# Patient Record
Sex: FEMALE | Race: BLACK OR AFRICAN AMERICAN | Employment: UNEMPLOYED | URBAN - METROPOLITAN AREA
[De-identification: names, ages, dates, MRNs, and addresses within clinical notes are randomized per-mention and may not be internally consistent; named-entity substitution may affect disease eponyms.]

---
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
## 2019-03-17 ENCOUNTER — APPOINTMENT (OUTPATIENT)
Dept: ULTRASOUND IMAGING | Facility: CLINIC | Age: 19
End: 2019-03-17
Attending: EMERGENCY MEDICINE

## 2019-03-17 ENCOUNTER — HOSPITAL ENCOUNTER (EMERGENCY)
Facility: CLINIC | Age: 19
Discharge: HOME | End: 2019-03-17
Attending: EMERGENCY MEDICINE | Admitting: EMERGENCY MEDICINE

## 2019-03-17 VITALS
OXYGEN SATURATION: 98 % | TEMPERATURE: 97.5 F | HEIGHT: 64 IN | HEART RATE: 90 BPM | RESPIRATION RATE: 18 BRPM | SYSTOLIC BLOOD PRESSURE: 118 MMHG | DIASTOLIC BLOOD PRESSURE: 75 MMHG | BODY MASS INDEX: 24.92 KG/M2 | WEIGHT: 146 LBS

## 2019-03-17 DIAGNOSIS — R35.0 URINARY FREQUENCY: ICD-10-CM

## 2019-03-17 DIAGNOSIS — R11.2 NON-INTRACTABLE VOMITING WITH NAUSEA, UNSPECIFIED VOMITING TYPE: ICD-10-CM

## 2019-03-17 DIAGNOSIS — R10.84 ABDOMINAL PAIN, GENERALIZED: ICD-10-CM

## 2019-03-17 LAB
ALBUMIN SERPL-MCNC: 3.9 G/DL (ref 3.4–5)
ALBUMIN UR-MCNC: 30 MG/DL
ALP SERPL-CCNC: 104 U/L (ref 40–150)
ALT SERPL W P-5'-P-CCNC: 13 U/L (ref 0–50)
ANION GAP SERPL CALCULATED.3IONS-SCNC: 8 MMOL/L (ref 3–14)
APPEARANCE UR: CLEAR
AST SERPL W P-5'-P-CCNC: 11 U/L (ref 0–35)
BASOPHILS # BLD AUTO: 0 10E9/L (ref 0–0.2)
BASOPHILS NFR BLD AUTO: 0 %
BILIRUB SERPL-MCNC: 0.3 MG/DL (ref 0.2–1.3)
BILIRUB UR QL STRIP: ABNORMAL
BUN SERPL-MCNC: 7 MG/DL (ref 7–30)
CALCIUM SERPL-MCNC: 8.6 MG/DL (ref 8.5–10.1)
CHLORIDE SERPL-SCNC: 110 MMOL/L (ref 96–110)
CO2 SERPL-SCNC: 22 MMOL/L (ref 20–32)
COLOR UR AUTO: YELLOW
CREAT SERPL-MCNC: 0.5 MG/DL (ref 0.5–1)
DIFFERENTIAL METHOD BLD: NORMAL
EOSINOPHIL # BLD AUTO: 0 10E9/L (ref 0–0.7)
EOSINOPHIL NFR BLD AUTO: 0.5 %
ERYTHROCYTE [DISTWIDTH] IN BLOOD BY AUTOMATED COUNT: 12.2 % (ref 10–15)
GFR SERPL CREATININE-BSD FRML MDRD: >90 ML/MIN/{1.73_M2}
GLUCOSE SERPL-MCNC: 92 MG/DL (ref 70–99)
GLUCOSE UR STRIP-MCNC: NEGATIVE MG/DL
HCG SERPL QL: NEGATIVE
HCT VFR BLD AUTO: 38.8 % (ref 35–47)
HGB BLD-MCNC: 12.7 G/DL (ref 11.7–15.7)
HGB UR QL STRIP: ABNORMAL
IMM GRANULOCYTES # BLD: 0 10E9/L (ref 0–0.4)
IMM GRANULOCYTES NFR BLD: 0.2 %
KETONES UR STRIP-MCNC: 5 MG/DL
LEUKOCYTE ESTERASE UR QL STRIP: ABNORMAL
LIPASE SERPL-CCNC: 75 U/L (ref 73–393)
LYMPHOCYTES # BLD AUTO: 1.2 10E9/L (ref 0.8–5.3)
LYMPHOCYTES NFR BLD AUTO: 20.3 %
MCH RBC QN AUTO: 28.3 PG (ref 26.5–33)
MCHC RBC AUTO-ENTMCNC: 32.7 G/DL (ref 31.5–36.5)
MCV RBC AUTO: 87 FL (ref 78–100)
MONOCYTES # BLD AUTO: 0.5 10E9/L (ref 0–1.3)
MONOCYTES NFR BLD AUTO: 8.7 %
MUCOUS THREADS #/AREA URNS LPF: PRESENT /LPF
NEUTROPHILS # BLD AUTO: 4.2 10E9/L (ref 1.6–8.3)
NEUTROPHILS NFR BLD AUTO: 70.3 %
NITRATE UR QL: NEGATIVE
PH UR STRIP: 5.5 PH (ref 5–7)
PLATELET # BLD AUTO: 296 10E9/L (ref 150–450)
POTASSIUM SERPL-SCNC: 3.6 MMOL/L (ref 3.4–5.3)
PROT SERPL-MCNC: 8.1 G/DL (ref 6.8–8.8)
RBC # BLD AUTO: 4.48 10E12/L (ref 3.8–5.2)
RBC #/AREA URNS AUTO: >182 /HPF (ref 0–2)
SODIUM SERPL-SCNC: 140 MMOL/L (ref 133–144)
SOURCE: ABNORMAL
SP GR UR STRIP: 1.03 (ref 1–1.03)
UROBILINOGEN UR STRIP-MCNC: NORMAL MG/DL (ref 0–2)
WBC # BLD AUTO: 6 10E9/L (ref 4–11)
WBC #/AREA URNS AUTO: 4 /HPF (ref 0–5)

## 2019-03-17 PROCEDURE — 25000125 ZZHC RX 250: Performed by: EMERGENCY MEDICINE

## 2019-03-17 PROCEDURE — 96375 TX/PRO/DX INJ NEW DRUG ADDON: CPT

## 2019-03-17 PROCEDURE — 81001 URINALYSIS AUTO W/SCOPE: CPT | Performed by: EMERGENCY MEDICINE

## 2019-03-17 PROCEDURE — 96361 HYDRATE IV INFUSION ADD-ON: CPT

## 2019-03-17 PROCEDURE — 99285 EMERGENCY DEPT VISIT HI MDM: CPT | Mod: 25

## 2019-03-17 PROCEDURE — 25000128 H RX IP 250 OP 636: Performed by: EMERGENCY MEDICINE

## 2019-03-17 PROCEDURE — 96374 THER/PROPH/DIAG INJ IV PUSH: CPT

## 2019-03-17 PROCEDURE — 80053 COMPREHEN METABOLIC PANEL: CPT | Performed by: EMERGENCY MEDICINE

## 2019-03-17 PROCEDURE — 25000132 ZZH RX MED GY IP 250 OP 250 PS 637: Performed by: EMERGENCY MEDICINE

## 2019-03-17 PROCEDURE — 83690 ASSAY OF LIPASE: CPT | Performed by: EMERGENCY MEDICINE

## 2019-03-17 PROCEDURE — 76705 ECHO EXAM OF ABDOMEN: CPT

## 2019-03-17 PROCEDURE — 85025 COMPLETE CBC W/AUTO DIFF WBC: CPT | Performed by: EMERGENCY MEDICINE

## 2019-03-17 PROCEDURE — 84703 CHORIONIC GONADOTROPIN ASSAY: CPT | Performed by: EMERGENCY MEDICINE

## 2019-03-17 RX ORDER — OXCARBAZEPINE 300 MG/1
600 TABLET, FILM COATED ORAL ONCE
Status: COMPLETED | OUTPATIENT
Start: 2019-03-17 | End: 2019-03-17

## 2019-03-17 RX ORDER — ACETAMINOPHEN 500 MG
1000 TABLET ORAL ONCE
Status: COMPLETED | OUTPATIENT
Start: 2019-03-17 | End: 2019-03-17

## 2019-03-17 RX ORDER — ONDANSETRON 2 MG/ML
4 INJECTION INTRAMUSCULAR; INTRAVENOUS ONCE
Status: COMPLETED | OUTPATIENT
Start: 2019-03-17 | End: 2019-03-17

## 2019-03-17 RX ORDER — ONDANSETRON 4 MG/1
4-8 TABLET, ORALLY DISINTEGRATING ORAL EVERY 8 HOURS PRN
Qty: 12 TABLET | Refills: 0 | Status: SHIPPED | OUTPATIENT
Start: 2019-03-17 | End: 2019-03-20

## 2019-03-17 RX ORDER — SUCRALFATE 1 G/1
1 TABLET ORAL ONCE
Status: COMPLETED | OUTPATIENT
Start: 2019-03-17 | End: 2019-03-17

## 2019-03-17 RX ORDER — OXCARBAZEPINE 600 MG/1
600 TABLET, FILM COATED ORAL 2 TIMES DAILY
Qty: 2 TABLET | Refills: 0 | Status: SHIPPED | OUTPATIENT
Start: 2019-03-17 | End: 2020-06-19

## 2019-03-17 RX ORDER — KETOROLAC TROMETHAMINE 15 MG/ML
15 INJECTION, SOLUTION INTRAMUSCULAR; INTRAVENOUS ONCE
Status: COMPLETED | OUTPATIENT
Start: 2019-03-17 | End: 2019-03-17

## 2019-03-17 RX ORDER — OXCARBAZEPINE 300 MG/1
600 TABLET, FILM COATED ORAL 2 TIMES DAILY
Qty: 4 TABLET | Refills: 0 | Status: SHIPPED | OUTPATIENT
Start: 2019-03-17 | End: 2020-06-19

## 2019-03-17 RX ADMIN — SODIUM CHLORIDE 1000 ML: 9 INJECTION, SOLUTION INTRAVENOUS at 11:29

## 2019-03-17 RX ADMIN — ACETAMINOPHEN 1000 MG: 500 TABLET, FILM COATED ORAL at 11:29

## 2019-03-17 RX ADMIN — OXCARBAZEPINE 600 MG: 300 TABLET, FILM COATED ORAL at 12:36

## 2019-03-17 RX ADMIN — LIDOCAINE HYDROCHLORIDE 30 ML: 20 SOLUTION ORAL; TOPICAL at 09:53

## 2019-03-17 RX ADMIN — ONDANSETRON 4 MG: 2 INJECTION INTRAMUSCULAR; INTRAVENOUS at 09:45

## 2019-03-17 RX ADMIN — SUCRALFATE 1 G: 1 TABLET ORAL at 11:49

## 2019-03-17 RX ADMIN — KETOROLAC TROMETHAMINE 15 MG: 15 INJECTION, SOLUTION INTRAMUSCULAR; INTRAVENOUS at 09:52

## 2019-03-17 RX ADMIN — RANITIDINE 150 MG: 150 TABLET ORAL at 11:29

## 2019-03-17 ASSESSMENT — MIFFLIN-ST. JEOR: SCORE: 1422.25

## 2019-03-17 ASSESSMENT — ENCOUNTER SYMPTOMS
COUGH: 0
ABDOMINAL PAIN: 1
SORE THROAT: 0
FEVER: 0
BLOOD IN STOOL: 0
NAUSEA: 1
FREQUENCY: 1
DIARRHEA: 0
VOMITING: 1

## 2019-03-17 NOTE — ED PROVIDER NOTES
"  History     Chief Complaint:  Abdominal Pain    The history is provided by the patient.      Rach Bello is a 18 year old female who presents with abdominal pain. The patient reports intermittent abdominal pain for the last couple of months. Today the pain became more severe and she had associated nausea and vomiting leading to her visit today. She denies vomiting up any blood. She states that she was not able to sleep last night because of the pain. She reports some increased urinary frequency and that she feels like she is hyperventilating. Rach denies any fever, cough, sore throat, diarrhea, and change in color of her stool. She is currently on her menstrual period which started yesterday.     Allergies:  No known drug allergies.     Medications:    Docusate sodium (colace) 100 mg tablet  Ferrous gluconate (fergon) 324 (38 fe) mg tablet  Oxcarbazepine (trileptal) 300 mg tablet     Past Medical History:    Closed fracture of unspecified bone   Migraine with aura and without status migrainosus, not intractable   Seizures  Anemia  Gastritis  Esophageal reflux    Past Surgical History:    Left supracondylar fracture repair    Family History:    History reviewed. No pertinent family history.    Social History:  Patient is single  Tobacco Use: Current smoker  Alcohol Use: No  PCP: Hieu Man     Review of Systems   Constitutional: Negative for fever.   HENT: Negative for sore throat.    Respiratory: Negative for cough.         Feels like hyperventilating   Gastrointestinal: Positive for abdominal pain, nausea and vomiting. Negative for blood in stool and diarrhea.   Genitourinary: Positive for frequency.   All other systems reviewed and are negative.    Physical Exam   First Vitals:  Patient Vitals for the past 24 hrs:   BP Temp Temp src Pulse Heart Rate Resp SpO2 Height Weight   03/17/19 1237 118/75 -- -- -- 66 18 98 % -- --   03/17/19 0918 (!) 128/94 97.5  F (36.4  C) Oral 90 -- 16 99 % 1.626 m (5' 4\") " 66.2 kg (146 lb)     Physical Exam  Eyes:               Sclera white; Pupils are equal and round  ENT:                External ears and nares normal  CV:                  Rate as above with regular rhythm   Resp:               Breath sounds clear and equal bilaterally                          Non-labored, no retractions or accessory muscle use  GI:                   Abdomen is soft, mild diffuse tenderness worse periumbilical and somewhat worse upper than lower, not focally worse at McBurney's point, negative Bro's                          No rebound tenderness or peritoneal features  :                  No CVA tenderness  MS:                  Moves all extremities    Emergency Department Course     Imaging:  Radiographic findings were communicated with the patient who voiced understanding of the findings.  US abdomen limits RUQ:  No cholelithiasis or biliary dilatation per radiology read.    Laboratory:  CBC:  WBC 6.0, HGB 12.7,   CMP: WNL. (Creatinine 0.50)  Lipase: 75  UA: Bilin small (A), Ketone 5 (A), Blood moderate (A), Protein Albumin 30 (A), Leukocyte Esterase trace (A), RBC >182 high, Mucous present (A), o/w WNL  HCG: Negative    Interventions:  0945: Zofran 4mg IV  0952: Toradol 15mg IV  0953: GI Cocktail 30mL PO  1129: Tylenol 1000mg PO  1129: Zantac 150mg PO  1149: Carafate 1g PO  1236: Trileptal 600mg PO    Emergency Department Course:  9:17 AM Nursing notes and vitals reviewed.  I performed an exam of the patient as documented above.     10:02 AM Patient reports that the GI cocktail helped.    11:19 AM I rechecked on and updated the patient.     1:01 PM Findings and plan explained to the patient. Patient discharged home with instructions regarding supportive care, medications, and reasons to return. The importance of close follow-up was reviewed.     Impression & Plan      Medical Decision Making:  The patient is here for evaluation of a month long of abdominal pain and vomiting with acutely  worsening symptoms. Workup does not demonstrate evidence of hepatitis, pancreatitis, or biliary pathology or pregnancy as a contributing feature. GI cocktail improved her symptoms significantly but improvement was transient. I think this suggests an upper GI source of symptoms. UA was ultimately negative for infection. I believe the hematuria is secondary to her currently being on her period. She will be started on symptomatic medications and following up with gastroenterology.      Diagnosis:    ICD-10-CM    1. Abdominal pain, generalized R10.84 GASTROENTEROLOGY ADULT REF CONSULT ONLY   2. Non-intractable vomiting with nausea, unspecified vomiting type R11.2 GASTROENTEROLOGY ADULT REF CONSULT ONLY   3. Urinary frequency R35.0        Disposition:  discharged to home    Discharge Medications:     Medication List      Started    ondansetron 4 MG ODT tab  Commonly known as:  ZOFRAN ODT  4-8 mg, Oral, EVERY 8 HOURS PRN     ranitidine 150 MG tablet  Commonly known as:  ZANTAC  150 mg, Oral, 2 TIMES DAILY        Modified    * OXcarbazepine 300 MG tablet  Commonly known as:  TRILEPTAL  What changed:  Another medication with the same name was added. Make sure you understand how and when to take each.     * OXcarbazepine 600 MG tablet  Commonly known as:  TRILEPTAL  600 mg, Oral, 2 TIMES DAILY  What changed:  You were already taking a medication with the same name, and this prescription was added. Make sure you understand how and when to take each.     * OXcarbazepine 300 MG tablet  Commonly known as:  TRILEPTAL  600 mg, Oral, 2 TIMES DAILY  What changed:  You were already taking a medication with the same name, and this prescription was added. Make sure you understand how and when to take each.         * This list has 3 medication(s) that are the same as other medications prescribed for you. Read the directions carefully, and ask your doctor or other care provider to review them with you.              I, Bradley Aasen, am  serving as a scribe on 3/17/2019 at 9:16 AM to personally document services performed by Blanka Cornelius MD based on my observations and the provider's statements to me.          Blanka Cornelius MD  03/18/19 2058

## 2019-03-17 NOTE — ED AVS SNAPSHOT
Emergency Department  [address] [facility] [address]  Phone:  [phone]  Fax:  [phone]                                    Rach Bello   MRN: [id number]    Department:   Emergency Department   Date of Visit:  3/17/2019           After Visit Summary Signature Page    I have received my discharge instructions, and my questions have been answered. I have discussed any challenges I see with this plan with the nurse or doctor.    ..........................................................................................................................................  Patient/Patient Representative Signature      ..........................................................................................................................................  Patient Representative Print Name and Relationship to Patient    ..................................................               ................................................  Date                                   Time    ..........................................................................................................................................  Reviewed by Signature/Title    ...................................................              ..............................................  Date                                               Time          22EPIC Rev 08/18

## 2020-06-18 ENCOUNTER — HOSPITAL ENCOUNTER (EMERGENCY)
Facility: CLINIC | Age: 20
Discharge: HOME | End: 2020-06-19
Attending: EMERGENCY MEDICINE | Admitting: EMERGENCY MEDICINE

## 2020-06-18 DIAGNOSIS — G40.919 BREAKTHROUGH SEIZURE (H): ICD-10-CM

## 2020-06-18 LAB — TROPONIN I BLD-MCNC: 0 UG/L (ref 0–0.08)

## 2020-06-18 PROCEDURE — 96361 HYDRATE IV INFUSION ADD-ON: CPT

## 2020-06-18 PROCEDURE — 99283 EMERGENCY DEPT VISIT LOW MDM: CPT | Mod: 25

## 2020-06-18 PROCEDURE — 25800030 ZZH RX IP 258 OP 636: Performed by: EMERGENCY MEDICINE

## 2020-06-18 PROCEDURE — 84484 ASSAY OF TROPONIN QUANT: CPT

## 2020-06-18 PROCEDURE — 80048 BASIC METABOLIC PNL TOTAL CA: CPT | Performed by: EMERGENCY MEDICINE

## 2020-06-18 PROCEDURE — 80183 DRUG SCRN QUANT OXCARBAZEPIN: CPT | Performed by: EMERGENCY MEDICINE

## 2020-06-18 PROCEDURE — 96360 HYDRATION IV INFUSION INIT: CPT

## 2020-06-18 PROCEDURE — 84703 CHORIONIC GONADOTROPIN ASSAY: CPT | Performed by: EMERGENCY MEDICINE

## 2020-06-18 PROCEDURE — 25000132 ZZH RX MED GY IP 250 OP 250 PS 637: Performed by: EMERGENCY MEDICINE

## 2020-06-18 RX ORDER — OXCARBAZEPINE 150 MG/1
150 TABLET, FILM COATED ORAL ONCE
Status: COMPLETED | OUTPATIENT
Start: 2020-06-18 | End: 2020-06-18

## 2020-06-18 RX ORDER — ACETAMINOPHEN 650 MG/1
650 SUPPOSITORY RECTAL ONCE
Status: DISCONTINUED | OUTPATIENT
Start: 2020-06-18 | End: 2020-06-19 | Stop reason: HOSPADM

## 2020-06-18 RX ADMIN — SODIUM CHLORIDE, POTASSIUM CHLORIDE, SODIUM LACTATE AND CALCIUM CHLORIDE 500 ML: 600; 310; 30; 20 INJECTION, SOLUTION INTRAVENOUS at 23:16

## 2020-06-18 RX ADMIN — OXCARBAZEPINE 150 MG: 150 TABLET, FILM COATED ORAL at 23:45

## 2020-06-18 NOTE — ED AVS SNAPSHOT
[facility] Emergency Department  [address]  [facility] [address]  Phone:  [phone]  Fax:  [phone]                                    Rach Bello   MRN: [id number]    Department:  [facility] Emergency Department   Date of Visit:  6/18/2020           After Visit Summary Signature Page    I have received my discharge instructions, and my questions have been answered. I have discussed any challenges I see with this plan with the nurse or doctor.    ..........................................................................................................................................  Patient/Patient Representative Signature      ..........................................................................................................................................  Patient Representative Print Name and Relationship to Patient    ..................................................               ................................................  Date                                   Time    ..........................................................................................................................................  Reviewed by Signature/Title    ...................................................              ..............................................  Date                                               Time          22EPIC Rev 08/18

## 2020-06-19 VITALS
WEIGHT: 145 LBS | OXYGEN SATURATION: 100 % | RESPIRATION RATE: 14 BRPM | TEMPERATURE: 98.6 F | BODY MASS INDEX: 24.89 KG/M2 | HEART RATE: 77 BPM | SYSTOLIC BLOOD PRESSURE: 113 MMHG | DIASTOLIC BLOOD PRESSURE: 92 MMHG

## 2020-06-19 LAB
10OH-CARBAZEPINE SERPL-MCNC: 8.1 UG/ML (ref 10–35)
ALBUMIN UR-MCNC: NEGATIVE MG/DL
ANION GAP SERPL CALCULATED.3IONS-SCNC: 6 MMOL/L (ref 3–14)
APPEARANCE UR: CLEAR
BACTERIA #/AREA URNS HPF: ABNORMAL /HPF
BILIRUB UR QL STRIP: NEGATIVE
BUN SERPL-MCNC: 8 MG/DL (ref 7–30)
CALCIUM SERPL-MCNC: 8.6 MG/DL (ref 8.5–10.1)
CHLORIDE SERPL-SCNC: 106 MMOL/L (ref 94–109)
CO2 SERPL-SCNC: 24 MMOL/L (ref 20–32)
COLOR UR AUTO: ABNORMAL
CREAT SERPL-MCNC: 0.54 MG/DL (ref 0.52–1.04)
GFR SERPL CREATININE-BSD FRML MDRD: >90 ML/MIN/{1.73_M2}
GLUCOSE SERPL-MCNC: 85 MG/DL (ref 70–99)
GLUCOSE UR STRIP-MCNC: NEGATIVE MG/DL
HCG SERPL QL: NEGATIVE
HGB UR QL STRIP: ABNORMAL
KETONES UR STRIP-MCNC: ABNORMAL MG/DL
LEUKOCYTE ESTERASE UR QL STRIP: NEGATIVE
MUCOUS THREADS #/AREA URNS LPF: PRESENT /LPF
NITRATE UR QL: NEGATIVE
PH UR STRIP: 7 PH (ref 5–7)
POTASSIUM SERPL-SCNC: 3.8 MMOL/L (ref 3.4–5.3)
RBC #/AREA URNS AUTO: 6 /HPF (ref 0–2)
SODIUM SERPL-SCNC: 136 MMOL/L (ref 133–144)
SOURCE: ABNORMAL
SP GR UR STRIP: 1.02 (ref 1–1.03)
SQUAMOUS #/AREA URNS AUTO: 2 /HPF (ref 0–1)
UROBILINOGEN UR STRIP-MCNC: NORMAL MG/DL (ref 0–2)
WBC #/AREA URNS AUTO: 1 /HPF (ref 0–5)

## 2020-06-19 PROCEDURE — 25000132 ZZH RX MED GY IP 250 OP 250 PS 637: Performed by: EMERGENCY MEDICINE

## 2020-06-19 PROCEDURE — 81001 URINALYSIS AUTO W/SCOPE: CPT | Performed by: EMERGENCY MEDICINE

## 2020-06-19 RX ORDER — ACETAMINOPHEN 325 MG/1
650 TABLET ORAL ONCE
Status: COMPLETED | OUTPATIENT
Start: 2020-06-19 | End: 2020-06-19

## 2020-06-19 RX ORDER — OXCARBAZEPINE 300 MG/1
600 TABLET, FILM COATED ORAL 2 TIMES DAILY
Qty: 4 TABLET | Refills: 0 | Status: SHIPPED | OUTPATIENT
Start: 2020-06-19 | End: 2021-01-24

## 2020-06-19 RX ADMIN — ACETAMINOPHEN 650 MG: 325 TABLET, FILM COATED ORAL at 00:46

## 2020-06-19 NOTE — ED TRIAGE NOTES
Patient had a two minute witnessed seizure.  History of seizures.  Skipped two days of meds.      ABCs intact.  Alert and oriented x 3.

## 2020-06-19 NOTE — ED PROVIDER NOTES
SARAY Provider Note  [facility] Emergency Department  1:45 AM  6/19/2020    Rach Bello  19 year oldfemale    Chief Complaint   Patient presents with     Seizures       HPI:    19-year-old female with a history of epilepsy on Trileptal here with breakthrough seizure.  Patient recently moved back to the area from [place] and has no neurologist in the area.  Missed the last 2 days of Trileptal and had a witnessed seizure today.  Currently she has a mild headache no vision deficits, extremity pain, abdominal pain.  Denies any chance that she is pregnant.  No recent febrile illness or infectious review of systems.    ROS: 10 point ROS completed and negative other than mentioned above    Past Medical History:   Diagnosis Date     Closed fracture of unspecified bone 8/2004    Left supracondylar fracture     Dental caries limited to enamel      Migraine with aura and without status migrainosus, not intractable 2/12/2016     Routine infant or child health check      Twin pregnancy, unspecified as to episode of care(651.00)     36 weeks - hospitalized x 2 weeks - no vent.     Past Surgical History:   Procedure Laterality Date     C NONSPECIFIC PROCEDURE  8/2004    Left supracondylar fracture repair         Social History     Socioeconomic History     Marital status: Single     Spouse name: Not on file     Number of children: Not on file     Years of education: Not on file     Highest education level: Not on file   Occupational History     Not on file   Social Needs     Financial resource strain: Not on file     Food insecurity     Worry: Not on file     Inability: Not on file     Transportation needs     Medical: Not on file     Non-medical: Not on file   Tobacco Use     Smoking status: Current Every Day Smoker     Packs/day: 0.25     Smokeless tobacco: Never Used     Tobacco comment: e cig   Substance and Sexual Activity     Alcohol use: No     Drug use: No     Sexual activity: Not on file   Lifestyle     Physical  activity     Days per week: Not on file     Minutes per session: Not on file     Stress: Not on file   Relationships     Social connections     Talks on phone: Not on file     Gets together: Not on file     Attends Tenriism service: Not on file     Active member of club or organization: Not on file     Attends meetings of clubs or organizations: Not on file     Relationship status: Not on file     Intimate partner violence     Fear of current or ex partner: Not on file     Emotionally abused: Not on file     Physically abused: Not on file     Forced sexual activity: Not on file   Other Topics Concern     Not on file   Social History Narrative     Not on file         Current Facility-Administered Medications   Medication     acetaminophen (TYLENOL) Suppository 650 mg     Current Outpatient Medications   Medication     OXcarbazepine (TRILEPTAL) 300 MG tablet     docusate sodium (COLACE) 100 MG tablet     ferrous gluconate (FERGON) 324 (38 Fe) MG tablet        Allergies   Allergen Reactions     No Known Drug Allergies          Physical Exam  Vitals: BP (!) 113/92   Pulse 77   Temp 98.6  F (37  C) (Oral)   Resp 14   Wt 65.8 kg (145 lb)   SpO2 100%   BMI 24.89 kg/m    Gen: well appearing, in no acute distress  HEENT:  mmm, no rhinorrhea, atraumatic, pupils are millimeters reactive bilaterally, extraocular meds are intact, visual acuity grossly normal  Neck: supple, no abnormal swelling  Lungs:  CTAB,  no resp distress  CV: rrr, no m/r/g, ppi  Abd: soft, nontender, nondistended, no rebound/masses/guarding/hsm  Ext: no peripheral edema  Skin: warm, dry, well perfused, no rashes/bruising/lesions on exposed skin  Neuro: alert, cranial nerves II through XII intact and symmetric, motor 5 out of 5 bilateral upper and lower extremities, sensation intact light touch all extremities, coordination grossly intact MAEE, no gross motor or sensory deficits, gait stable  Psych: Normal mood, normal affect      Labs and  Imaging:    Labs Ordered and Resulted from Time of ED Arrival Up to the Time of Departure from the ED   ROUTINE UA WITH MICROSCOPIC - Abnormal; Notable for the following components:       Result Value    Ketones Urine Trace (*)     Blood Urine Trace (*)     RBC Urine 6 (*)     Bacteria Urine Few (*)     Squamous Epithelial /HPF Urine 2 (*)     Mucous Urine Present (*)     All other components within normal limits   BASIC METABOLIC PANEL   HCG QUALITATIVE   GLUCOSE MONITOR NURSING POCT   ISTAT HCG QUANTITATIVE PREGNANCY NURSING POCT   TROPONIN POCT          No orders to display            ED Medications:   Medications   acetaminophen (TYLENOL) Suppository 650 mg ( Rectal Canceled Entry 20 0047)   lactated ringers BOLUS 500 mL (0 mLs Intravenous Stopped 20 0104)   OXcarbazepine (TRILEPTAL) tablet 150 mg (150 mg Oral Given 20 2345)   acetaminophen (TYLENOL) tablet 650 mg (650 mg Oral Given 20 0046)             Medical Decision Makin-year-old female with breakthrough seizure in the setting of medication noncompliance.  No concerns here for secondary traumatic injury.  Nonfocal neurologic examination.  Unremarkable BMP and urinalysis.  Will give her an extra dose of Trileptal to get her blood levels back more towards normal and decrease likelihood of recurrent breakthrough seizure.  Gave her prescription for home and referral to [facility] which was faxed over to help expedite follow-up and establish care in this area.  She is comfortable agreeable this plan understands when to return here to the ED.      Diagnosis:    ICD-10-CM    1. Breakthrough seizure (H)  G40.919 Oxcarbazepine level     UA with Microscopic         Disposition:  home      Mamadou Donovan MD  [facility]       Mamadou Donovan MD  20 3149

## 2020-11-05 ENCOUNTER — APPOINTMENT (OUTPATIENT)
Dept: GENERAL RADIOLOGY | Facility: CLINIC | Age: 20
End: 2020-11-05
Attending: EMERGENCY MEDICINE

## 2020-11-05 ENCOUNTER — HOSPITAL ENCOUNTER (EMERGENCY)
Facility: CLINIC | Age: 20
Discharge: HOME | End: 2020-11-05
Attending: EMERGENCY MEDICINE | Admitting: EMERGENCY MEDICINE

## 2020-11-05 VITALS
HEIGHT: 63 IN | WEIGHT: 150 LBS | DIASTOLIC BLOOD PRESSURE: 69 MMHG | SYSTOLIC BLOOD PRESSURE: 113 MMHG | OXYGEN SATURATION: 100 % | RESPIRATION RATE: 16 BRPM | HEART RATE: 72 BPM | BODY MASS INDEX: 26.58 KG/M2 | TEMPERATURE: 98.9 F

## 2020-11-05 DIAGNOSIS — S90.31XA CONTUSION OF RIGHT FOOT, INITIAL ENCOUNTER: ICD-10-CM

## 2020-11-05 PROCEDURE — 73630 X-RAY EXAM OF FOOT: CPT | Mod: RT

## 2020-11-05 PROCEDURE — 73610 X-RAY EXAM OF ANKLE: CPT | Mod: RT

## 2020-11-05 PROCEDURE — 99284 EMERGENCY DEPT VISIT MOD MDM: CPT

## 2020-11-05 ASSESSMENT — ENCOUNTER SYMPTOMS
HEADACHES: 1
JOINT SWELLING: 1
ARTHRALGIAS: 1

## 2020-11-05 ASSESSMENT — MIFFLIN-ST. JEOR: SCORE: 1414.53

## 2020-11-05 NOTE — ED AVS SNAPSHOT
[facility] Emergency Dept  [address] [address]  Phone: [phone]  Fax: [phone]                                    Rach Bello   MRN: [id number]    Department: [facility] Emergency Dept   Date of Visit: 11/5/2020           After Visit Summary Signature Page    I have received my discharge instructions, and my questions have been answered. I have discussed any challenges I see with this plan with the nurse or doctor.    ..........................................................................................................................................  Patient/Patient Representative Signature      ..........................................................................................................................................  Patient Representative Print Name and Relationship to Patient    ..................................................               ................................................  Date                                   Time    ..........................................................................................................................................  Reviewed by Signature/Title    ...................................................              ..............................................  Date                                               Time          22EPIC Rev 08/18
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
## 2020-11-22 ENCOUNTER — HEALTH MAINTENANCE LETTER (OUTPATIENT)
Age: 20
End: 2020-11-22

## 2020-12-30 NOTE — MR AVS SNAPSHOT
After Visit Summary   3/26/2018    Rach Bello    MRN: [id number]           Patient Information     Date Of Birth          2000        Visit Information        Provider Department      3/26/2018 10:00 AM Chanel Pritchett MD [facility]        Today's Diagnoses     Gastroesophageal reflux disease, esophagitis presence not specified    -  1    Constipation, unspecified constipation type        History of anemia          Care Instructions    Here is the plan from today's visit    1. Gastroesophageal reflux disease, esophagitis presence not specified  Start taking ranitidine AFTER you send in your h.pylori stool test  - ranitidine (ZANTAC) 75 MG tablet; Take 1 tablet (75 mg) by mouth 2 times daily Reported on 4/7/2017  Dispense: 60 tablet; Refill: 3  - H Pylori antigen stool; Future    2. Constipation, unspecified constipation type  - drink more water  - high fiber foods    CONSTIPATION TREATMENTS    REGULAR BATHROOM TIMES  Frequency should be daily.  Most children have a time of the day that seems to work best for them, often after meals or after school.  Young children may respond to treats for sitting on the toilet, and later for having bowel movements.  These have their best effect during the first couple weeks, and tend to lose their effectiveness later.  At school children need to have free access to the bathroom, especially if they are on a laxative.  Some children may need a pass to the nurse's office if privacy is an issue.    EXERCISE  Not only is this good for the cardiovascular system, but it will stimulate the colonic contractions.  Conversely, sitting in front of a television will make constipation worse.    FOODS  There are several foods that often make constipation much worse.  Unfortunately, they often make up a large portion of many children's diets.  While most children will offer resistance to changes in their foods, if only a new set of foods is  Dr. Rivera informed of EKG and history of Afib - no cardiologist - on Xarelto by primary MD - ok to proceed   "offered, most will accept this within two to three weeks.                               CONSTIPATING FOODS                           Cheese                           White bread                           White rice                           Bananas                           (Milk)                               HELPFUL FOODS                           Whole grain breads                           Fruits and vegetables (fruits that start with a \"P\")                                   Prunes, plums, peaches, pears                           Plentiful water       3. History of anemia  - CBC today      Please call or return to clinic if your symptoms don't go away.    Follow up plan  Please make a clinic appointment for follow up with me (SARAVANAN ULRICH) in 1  month for abdominal pain follow up.    Thank you for coming to [facility] today.  Lab Testing:  **If you had lab testing today and your results are reassuring or normal they will be mailed to you or sent through [employer] within 7 days.   **If the lab tests need quick action we will call you with the results.  The phone number we will call with results is # [phone] (home) . If this is not the best number please call our clinic and change the number.  Medication Refills:  If you need any refills please call your pharmacy and they will contact us.   If you need to  your refill at a new pharmacy, please contact the new pharmacy directly. The new pharmacy will help you get your medications transferred faster.   Scheduling:  If you have any concerns about today's visit or wish to schedule another appointment please call our office during normal business hours [phone] (8-5:00 M-F)  If a referral was made to a [facility] and you don't get a call from central scheduling please call [phone].  If a Mammogram was ordered for you at [facility] call [phone] to schedule or change your appointment.  If you had an " XRay/CT/Ultrasound/MRI ordered the number is [phone] to schedule or change your radiology appointment.   Medical Concerns:  If you have urgent medical concerns please call [phone] at any time of the day.    Chanel Pritchett MD            Follow-ups after your visit        Your next 10 appointments already scheduled     Mar 29, 2018 10:20 AM CDT   PHYSICAL with Chanel Pritchett MD   [facility]    [address] [place] [address],  [address]  [place] [address]   [phone]              Future tests that were ordered for you today     Open Future Orders        Priority Expected Expires Ordered    H Pylori antigen stool Routine  2018 3/26/2018            Who to contact     Please call your clinic at [phone] to:    Ask questions about your health    Make or cancel appointments    Discuss your medicines    Learn about your test results    Speak to your doctor            Additional Information About Your Visit        MyChart Information     [employer] is an electronic gateway that provides easy, online access to your medical records. With [employer], you can request a clinic appointment, read your test results, renew a prescription or communicate with your care team.     To sign up for [employer] visit the website at [email or web address]   You will be asked to enter the access code listed below, as well as some personal information. Please follow the directions to create your username and password.     Your access code is: [email or web address]  Expires: 2018 10:57 AM     Your access code will  in 90 days. If you need help or a new code, please contact your [facility] or call [phone] for assistance.      [employer] is an electronic gateway that provides easy, online access to your medical records. With Live[employer], you can request a clinic appointment, read your test results, renew a prescription or communicate with your care  team.     To sign up for [employer])hart, please contact your [facility] or call [phone] for assistance.           Care EveryWhere ID     This is your Care EveryWhere ID. This could be used by other organizations to access your [facility] medical records  [id number]        Your Vitals Were     Pulse Temperature Last Period Pulse Oximetry Breastfeeding? BMI (Body Mass Index)    76 98.1  F (36.7  C) (Oral) 03/12/2018 100% No 25.08 kg/m2       Blood Pressure from Last 3 Encounters:   03/26/18 128/79   04/07/17 133/77   02/12/16 113/78    Weight from Last 3 Encounters:   03/26/18 141 lb 9.6 oz (64.2 kg) (76 %)*   04/07/17 139 lb (63 kg) (76 %)*   02/12/16 144 lb 6.4 oz (65.5 kg) (83 %)*     * Growth percentiles are based on [facility] 2-20 Years data.              We Performed the Following     CBC with Plt (Kirby)          Where to get your medicines      These medications were sent to [facility] [place], [place] - [address]  [address] [facility] [address]     Phone:  [phone]     ranitidine 75 MG tablet          Primary Care Provider Fax #    Physician No Ref-Primary [phone]       No address on file        Equal Access to Services     ANNY DE LA GARZA : Renny Mendoza, waaxda luqadaha, qaybta kaalmamaría cook, chris hernandez. So [facility] [phone].    ATENCIÓN: Si habla español, tiene a main disposición servicios gratuitos de asistencia lingüística. Llame al [phone].    We comply with applicable federal civil rights laws and [place] laws. We do not discriminate on the basis of race, color, national origin, age, disability, sex, sexual orientation, or gender identity.            Thank you!     Thank you for choosing [facility]  for your care. Our goal is always to provide you with excellent care. Hearing back from our patients is one way we can continue to improve our services. Please take a few  minutes to complete the written survey that you may receive in the mail after your visit with us. Thank you!             Your Updated Medication List - Protect others around you: Learn how to safely use, store and throw away your medicines at www.disposemymeds.org.          This list is accurate as of 3/26/18 11:00 AM.  Always use your most recent med list.                   Brand Name Dispense Instructions for use Diagnosis    ferrous gluconate 324 (38 FE) MG tablet    FERGON    60 tablet    Take 1 tablet (324 mg) by mouth 2 times daily    Other iron deficiency anemia       OXcarbazepine 300 MG tablet    TRILEPTAL     Take 2 tablets by mouth 2 times daily Reported on 4/7/2017        ranitidine 75 MG tablet    ZANTAC    60 tablet    Take 1 tablet (75 mg) by mouth 2 times daily Reported on 4/7/2017    Gastroesophageal reflux disease, esophagitis presence not specified

## 2021-01-15 ENCOUNTER — HEALTH MAINTENANCE LETTER (OUTPATIENT)
Age: 21
End: 2021-01-15

## 2021-01-24 ENCOUNTER — HOSPITAL ENCOUNTER (EMERGENCY)
Facility: CLINIC | Age: 21
Discharge: HOME | End: 2021-01-24
Attending: EMERGENCY MEDICINE | Admitting: EMERGENCY MEDICINE

## 2021-01-24 VITALS
DIASTOLIC BLOOD PRESSURE: 68 MMHG | HEART RATE: 88 BPM | TEMPERATURE: 98.3 F | RESPIRATION RATE: 18 BRPM | OXYGEN SATURATION: 99 % | SYSTOLIC BLOOD PRESSURE: 122 MMHG

## 2021-01-24 DIAGNOSIS — Z76.0 ENCOUNTER FOR MEDICATION REFILL: ICD-10-CM

## 2021-01-24 PROCEDURE — 99283 EMERGENCY DEPT VISIT LOW MDM: CPT

## 2021-01-24 RX ORDER — OXCARBAZEPINE 300 MG/1
600 TABLET, FILM COATED ORAL 2 TIMES DAILY
Qty: 56 TABLET | Refills: 0 | Status: SHIPPED | OUTPATIENT
Start: 2021-01-24 | End: 2021-02-07

## 2021-01-24 ASSESSMENT — ENCOUNTER SYMPTOMS
VOMITING: 0
FEVER: 0
HEADACHES: 0

## 2021-01-25 ASSESSMENT — ENCOUNTER SYMPTOMS
ABDOMINAL PAIN: 0
COUGH: 0
SEIZURES: 0

## 2021-01-25 NOTE — ED PROVIDER NOTES
History     Chief Complaint:  Medication Refill and Headache       The history is provided by the patient.     Rach Bello is a 20 year old female with a history of epilepsy who presents for evaluation of a medication refill. She normally takes Trileptal for epilepsy, but has been out of it for two days due to not having insurance.  She was told by her neurology clinic that she needs to have insurance to have an appointment.  Her insurance company instructed her to come to the ER to get a refill. She has been taking a decreased amount of her medication to make it last. Her insurance company directed her to go to the emergency department for a refill. She does not have a scheduled appointment. She denies any current headache, vomiting, or fever.  She has not had any recent seizures.  She otherwise is at her normal state of health.    Review of Systems   Constitutional: Negative for fever.   Respiratory: Negative for cough.    Gastrointestinal: Negative for abdominal pain and vomiting.   Neurological: Negative for seizures and headaches.       Allergies:  No Known Drug Allergies    Medications:   Colace  Fergon  Trileptal      Medical History:   Gastritis and gastroduodenitis  Esophageal reflux  Anemia  Seizures   Migraine with aura and without status migrainosus, not intractable     Past Surgical History:    Left supracondylar fracture repair    Social History:  The patient was accompanied to the ED by her older cousin.  PCP: [facility]     Physical Exam     Patient Vitals for the past 24 hrs:   BP Temp Pulse Resp SpO2   01/24/21 1921 122/68 98.3  F (36.8  C) 88 18 99 %      Physical Exam  Constitutional: Well appearing.  HEENT: Atraumatic.  Moist mucous membranes.  Cardiac: Regular rate and rhythm.  No murmur or rub.  Respiratory: Clear to auscultation bilaterally.  No respiratory distress.    Neurologic: Alert and oriented x3.  Normal tone and bulk.   Normal gait.  Psych: Normal affect.   Normal behavior.      Emergency Department Course   Emergency Department Course:  Reviewed:  1960 I reviewed nursing notes, vitals, past medical history and care everywhere    Disposition:  The patient was discharged to home.   Impression & Plan   Medical Decision Making:  Rach Bello is a 20-year-old woman who is afebrile and hemodynamically stable.  She is neurologically intact with no focal deficits.  She reports he is only here to get a refill of her Trileptal.  She is working on getting insurance and should have it in the next couple days.  She is been without her Trileptal for couple days so did not think any blood levels would be helpful at this time.  Discussed plan for her prescription for her Trileptal for 2 weeks and she needs to follow-up with her neurologist and she is understanding.  There is no indication for any further testing and she feels countable discharging home.  She was in no distress at time of discharge.      Diagnosis:     ICD-10-CM    1. Encounter for medication refill  Z76.0         Discharge Medications:  There are no discharge medications for this patient.    Scribe Disclosure:  I, Lenore Hatfield, am serving as a scribe at 7:30 PM on 1/24/2021 to document services personally performed by Trent Sanches MD based on my observations and the provider's statements to me.     [facility]     Trent Sanches MD  01/25/21 5426

## 2021-01-25 NOTE — ED TRIAGE NOTES
Pt has epilepsy, normally takes oxcarbazepine.   Has been taking reduced doses of medication for several weeks and ran out 2 days ago.  No seizure activity, but endorses headache since reducing medication.

## 2021-09-19 ENCOUNTER — HEALTH MAINTENANCE LETTER (OUTPATIENT)
Age: 21
End: 2021-09-19

## 2022-01-09 ENCOUNTER — HEALTH MAINTENANCE LETTER (OUTPATIENT)
Age: 22
End: 2022-01-09

## 2022-11-20 ENCOUNTER — HEALTH MAINTENANCE LETTER (OUTPATIENT)
Age: 22
End: 2022-11-20

## 2023-04-15 ENCOUNTER — HEALTH MAINTENANCE LETTER (OUTPATIENT)
Age: 23
End: 2023-04-15

## 2023-07-18 NOTE — PATIENT INSTRUCTIONS
"Here is the plan from today's visit    1. Gastroesophageal reflux disease, esophagitis presence not specified  Start taking ranitidine AFTER you send in your h.pylori stool test  - ranitidine (ZANTAC) 75 MG tablet; Take 1 tablet (75 mg) by mouth 2 times daily Reported on 4/7/2017  Dispense: 60 tablet; Refill: 3  - H Pylori antigen stool; Future    2. Constipation, unspecified constipation type  - drink more water  - high fiber foods    CONSTIPATION TREATMENTS    REGULAR BATHROOM TIMES  Frequency should be daily.  Most children have a time of the day that seems to work best for them, often after meals or after school.  Young children may respond to treats for sitting on the toilet, and later for having bowel movements.  These have their best effect during the first couple weeks, and tend to lose their effectiveness later.  At school children need to have free access to the bathroom, especially if they are on a laxative.  Some children may need a pass to the nurse's office if privacy is an issue.    EXERCISE  Not only is this good for the cardiovascular system, but it will stimulate the colonic contractions.  Conversely, sitting in front of a television will make constipation worse.    FOODS  There are several foods that often make constipation much worse.  Unfortunately, they often make up a large portion of many children's diets.  While most children will offer resistance to changes in their foods, if only a new set of foods is offered, most will accept this within two to three weeks.                               CONSTIPATING FOODS                           Cheese                           White bread                           White rice                           Bananas                           (Milk)                               HELPFUL FOODS                           Whole grain breads                           Fruits and vegetables (fruits that start with a \"P\")                                   Prunes, " plums, peaches, pears                           Plentiful water       3. History of anemia  - CBC today      Please call or return to clinic if your symptoms don't go away.    Follow up plan  Please make a clinic appointment for follow up with me (CHANEL ULRICH) in 1  month for abdominal pain follow up.    Thank you for coming to [facility] today.  Lab Testing:  **If you had lab testing today and your results are reassuring or normal they will be mailed to you or sent through [employer] within 7 days.   **If the lab tests need quick action we will call you with the results.  The phone number we will call with results is # [phone] (home) . If this is not the best number please call our clinic and change the number.  Medication Refills:  If you need any refills please call your pharmacy and they will contact us.   If you need to  your refill at a new pharmacy, please contact the new pharmacy directly. The new pharmacy will help you get your medications transferred faster.   Scheduling:  If you have any concerns about today's visit or wish to schedule another appointment please call our office during normal business hours [phone] (8-5:00 M-F)  If a referral was made to a [facility] and you don't get a call from central scheduling please call [phone].  If a Mammogram was ordered for you at [facility] call [phone] to schedule or change your appointment.  If you had an XRay/CT/Ultrasound/MRI ordered the number is [phone] to schedule or change your radiology appointment.   Medical Concerns:  If you have urgent medical concerns please call [phone] at any time of the day.    Chanel Ulrich MD     [address]